# Patient Record
Sex: MALE | Race: BLACK OR AFRICAN AMERICAN | NOT HISPANIC OR LATINO | URBAN - METROPOLITAN AREA
[De-identification: names, ages, dates, MRNs, and addresses within clinical notes are randomized per-mention and may not be internally consistent; named-entity substitution may affect disease eponyms.]

---

## 2021-06-15 ENCOUNTER — OFFICE VISIT (OUTPATIENT)
Dept: URBAN - METROPOLITAN AREA CLINIC 37 | Facility: CLINIC | Age: 56
End: 2021-06-15

## 2021-06-15 VITALS — HEIGHT: 70 IN | WEIGHT: 183 LBS | BODY MASS INDEX: 26.2 KG/M2

## 2021-06-15 RX ORDER — ATORVASTATIN CALCIUM 20 MG/1
1 TABLET TABLET, FILM COATED ORAL ONCE A DAY
Status: ACTIVE | COMMUNITY

## 2021-06-15 RX ORDER — TAMSULOSIN HYDROCHLORIDE 0.4 MG/1
1 CAPSULE CAPSULE ORAL ONCE A DAY
Status: ACTIVE | COMMUNITY

## 2021-06-15 RX ORDER — HYDROCORTISONE ACETATE, PRAMOXINE HCL 2.5; 1 G/100G; G/100G
1 APPLICATION AS NEEDED CREAM TOPICAL THREE TIMES A DAY
Status: ACTIVE | COMMUNITY

## 2021-06-15 RX ORDER — SITAGLIPTIN AND METFORMIN HYDROCHLORIDE 50; 1000 MG/1; MG/1
1 TABLET WITH MEALS TABLET, FILM COATED ORAL ONCE A DAY
Status: ACTIVE | COMMUNITY

## 2021-06-15 RX ORDER — ASPIRIN 81 MG/1
1 TABLET TABLET, CHEWABLE ORAL ONCE A DAY
Status: ACTIVE | COMMUNITY

## 2021-06-15 RX ORDER — EMPAGLIFLOZIN 25 MG/1
1 TABLET TABLET, FILM COATED ORAL ONCE A DAY
Status: ACTIVE | COMMUNITY

## 2021-06-15 NOTE — HPI-MIGRATED HPI
Colorectal cancer screening : Family history of GI malignancy: -> Denies;   Colorectal cancer screening : Patients denies -> rectal bleeding, change in bowel habits, constipation, diarrhea, or abdominal pain;   Colorectal cancer screening : Current bowel movement patterns -> ?;   Colorectal cancer screening : Patient is here for initial consultation for -> first time screening colonoscopy;   Initial consultation : Patient is here for -> GERD Onset of symptoms:  Characteristics:  Aggravating factors: Associated symptoms: abdominal pain ? Relieving factors: Medications tried: Tests/evaluations done previously: Denies prior EGD??;   Interim investigations : Labs done on: ->  * 05/29/2021, odered by PCP:  - CBC: WBC: 4.9; RBC: 5.79; Hgb: 15.5; Hct: 48.7; Plt: 277 - CMP: Glu: 135 (H); BUN: 14; Cr: 0.80; Na: 140; K: 4.4; Alb: 4.4; ALT: 16; AST: 19; ALP: 81; Tbili: 0.4;     Colorectal cancer screening : Denies dysphagia or odynophagia Currently taking anticoagulants/NSAIDs: ?;

## 2021-08-02 ENCOUNTER — DASHBOARD ENCOUNTERS (OUTPATIENT)
Age: 56
End: 2021-08-02

## 2021-08-04 ENCOUNTER — OFFICE VISIT (OUTPATIENT)
Dept: URBAN - METROPOLITAN AREA CLINIC 35 | Facility: CLINIC | Age: 56
End: 2021-08-04

## 2021-08-04 NOTE — HPI-MIGRATED HPI
;     Abdominal Pain : hussein presents today for abdominal pain. The abdominal pain began  Patient describes the pain as and episodes occur  . Patient states that makes the pain worse.  makes the pain better. Patient has not tried taking any medications for abdominal pain. The abdominal pain has/has not interrupted sleep. Patient admits/denies nausea and vomiting. Patient admits/denies gas and bloating. Patient admits/denies diarrhea. Patient admits/denies constipation. He/She is having  bowel movements per day and states that stools are  Patient denies hematemesis or hematochezia. After patient has a bowel movement, there is/is no improvement in the abdominal pain. Patient admits/denies any early satiety. There has/has not been any unintentional weight loss. She/He admits/denies any changes in his/her medication in the last 6 months. He/She has/has not taken any antibiotics in the last 6 months. Patient took --- for ---. Patient admits/denies NSAID use. Patient has/has not consumed any lake or well water. Patient has not had any recent imaging, procedures, or blood work regarding symptoms. Patient denies any ER visits for this abdominal pain. Patient denies a family history of colon, esophageal or gastric cancers. Patient has/has not had a previous colonoscopy or EGD. This was done by  and revealed  ;